# Patient Record
Sex: MALE | Race: WHITE | URBAN - METROPOLITAN AREA
[De-identification: names, ages, dates, MRNs, and addresses within clinical notes are randomized per-mention and may not be internally consistent; named-entity substitution may affect disease eponyms.]

---

## 2018-03-19 ENCOUNTER — OFFICE VISIT (OUTPATIENT)
Dept: NEUROLOGY | Age: 38
End: 2018-03-19

## 2018-03-19 VITALS
OXYGEN SATURATION: 98 % | HEIGHT: 70 IN | RESPIRATION RATE: 19 BRPM | HEART RATE: 76 BPM | TEMPERATURE: 98.2 F | WEIGHT: 178.2 LBS | DIASTOLIC BLOOD PRESSURE: 78 MMHG | BODY MASS INDEX: 25.51 KG/M2 | SYSTOLIC BLOOD PRESSURE: 118 MMHG

## 2018-03-19 DIAGNOSIS — G43.111 INTRACTABLE MIGRAINE WITH AURA WITH STATUS MIGRAINOSUS: Primary | ICD-10-CM

## 2018-03-19 RX ORDER — FROVATRIPTAN SUCCINATE 2.5 MG/1
TABLET, FILM COATED ORAL AS NEEDED
COMMUNITY
Start: 2018-03-15

## 2018-03-19 RX ORDER — VERAPAMIL HYDROCHLORIDE 120 MG/1
CAPSULE, EXTENDED RELEASE ORAL
Refills: 4 | COMMUNITY
Start: 2018-02-14

## 2018-03-19 RX ORDER — DEXTROAMPHETAMINE SACCHARATE, AMPHETAMINE ASPARTATE, DEXTROAMPHETAMINE SULFATE AND AMPHETAMINE SULFATE 5; 5; 5; 5 MG/1; MG/1; MG/1; MG/1
TABLET ORAL 3 TIMES DAILY
COMMUNITY
Start: 2018-03-05

## 2018-03-19 RX ORDER — METHYLPHENIDATE HYDROCHLORIDE 10 MG/1
TABLET ORAL
COMMUNITY
Start: 2018-01-25

## 2018-03-19 RX ORDER — METHYLPREDNISOLONE 4 MG/1
TABLET ORAL
COMMUNITY
Start: 2018-03-15

## 2018-03-19 RX ORDER — DIVALPROEX SODIUM 250 MG/1
TABLET, EXTENDED RELEASE ORAL 2 TIMES DAILY
COMMUNITY
Start: 2018-03-15

## 2018-03-19 RX ORDER — NABUMETONE 750 MG/1
TABLET, FILM COATED ORAL DAILY
COMMUNITY
Start: 2018-02-25

## 2018-03-19 RX ORDER — ATOMOXETINE 40 MG/1
CAPSULE ORAL 2 TIMES DAILY
COMMUNITY
Start: 2018-03-14

## 2018-03-19 RX ORDER — RIZATRIPTAN BENZOATE 10 MG/1
TABLET, ORALLY DISINTEGRATING ORAL AS NEEDED
COMMUNITY
Start: 2018-03-05

## 2018-03-19 RX ORDER — NARATRIPTAN 2.5 MG/1
TABLET ORAL AS NEEDED
COMMUNITY
Start: 2018-02-22

## 2018-03-19 NOTE — PATIENT INSTRUCTIONS
Recommended not Vane Wilkinson 'triptan'  Daily. We discussed analgesic induced HA and the use of analgesics more than 2-3 times weekly will do nothing but drive the HA and to break this cycle all analgesics, whether OTC or prescribed have to be discontinued and HA likely to get worse before they get better     Abortive therapy that may be helpful : zembrace, zomig, onzetra.

## 2018-03-19 NOTE — PROGRESS NOTES
German Bernard is a 40 y.o. male presents today as a new patient. Patient was diagnosed  with migraines at the age of 27. Patient started treatment at the age of 27. Patient had no days of being headache free in the past 30 days. Patient has been having headaches everyday. Patient have sensitivity to light and sound. Patient stated the headaches worsen with movement. Paitent has experienced nausea in the last month. Triggering factors are anxiety/worry, bright lights, food , high pitched noises, irregular meals over exertion and salty food. Patient describes the pain as throbbing, pulsation, pressing, squeezing, dull ache.nagging, and stabbing intermittently. Patient had a head injury and loss consciousness at the age of 13 or 12. Darkness, quiet, rest, scalp/temple pressure and activity  makes the headaches better per patient. Patient exercise regularly, depressed /anxoius due to the migraines and drinks 2 cups of coffee or tea.

## 2018-03-19 NOTE — MR AVS SNAPSHOT
[address] [place] [address] [address] [place] [address] 
[phone] Patient: Eber Vu MRN: [id number]:[id number] Visit Information Date & Time Provider Department Dept. Phone Encounter #  
 3/19/2018  8:00 AM HENRIETTA [facility] [phone] [id number] Upcoming Health Maintenance Date Due DTaP/Tdap/Td series (1 - Tdap) 4/1/2001 Influenza Age 5 to Adult 8/1/2017 Allergies as of 3/19/2018  Review Complete On: 3/19/2018 By: Bruce Baires LPN No Known Allergies Current Immunizations  Never Reviewed No immunizations on file. Not reviewed this visit Vitals BP Pulse Temp Resp Height(growth percentile) Weight(growth percentile) 118/78 76 98.2 °F (36.8 °C) (Oral) 19 5' 10\" (1.778 m) 178 lb 3.2 oz (80.8 kg) SpO2 BMI 98% 25.57 kg/m2 BMI and BSA Data Body Mass Index Body Surface Area 25.57 kg/m 2 2 m 2 Preferred Pharmacy Pharmacy Name Phone [address] [facility]. [phone] Your Updated Medication List  
  
   
This list is accurate as of 3/19/18  9:14 AM.  Always use your most recent med list.  
  
  
  
  
 atomoxetine 40 mg capsule Commonly known as:  STRATTERA  
two (2) times a day. dextroamphetamine-amphetamine 20 mg tablet Commonly known as:  ADDERALL  
three (3) times daily. divalproex  mg ER tablet Commonly known as:  DEPAKOTE ER  
two (2) times a day. frovatriptan 2.5 mg tablet Commonly known as:  Jullie Getting  
as needed. methylphenidate HCl 10 mg tablet Commonly known as:  RITALIN  
  
 methylPREDNISolone 4 mg tablet Commonly known as:  MEDROL DOSEPACK  
  
 nabumetone 750 mg tablet Commonly known as:  RELAFEN  
daily. 2 tablets  
  
 naratriptan 2.5 mg Tab Commonly known as:  Suan Radha  
 as needed. rizatriptan 10 mg disintegrating tablet Commonly known as:  MAXALT-MLT  
as needed. verapamil  mg ER capsule Commonly known as:  VERELAN  
TK ONE C PO HS Patient Instructions Recommended not Nicol Downer 'triptan'  Daily. We discussed analgesic induced HA and the use of analgesics more than 2-3 times weekly will do nothing but drive the HA and to break this cycle all analgesics, whether OTC or prescribed have to be discontinued and HA likely to get worse before they get better Abortive therapy that may be helpful : zembrace, zomig, onzetra. Introducing [facility]! Zuhair Freeman introduces [email or web address] patient portal. Now you can access parts of your medical record, email your doctor's office, and request medication refills online. 1. In your internet browser, go to https://"[employer]. [employer]/"[employer] 2. Click on the First Time User? Click Here link in the Sign In box. You will see the New Member Sign Up page. 3. Enter your [email or web address] Access Code exactly as it appears below. You will not need to use this code after youve completed the sign-up process. If you do not sign up before the expiration date, you must request a new code. · [email or web address] Access Code: [email or web address] Expires: 6/17/2018  9:04 AM 
 
4. Enter the last four digits of your Social Security Number (xxxx) and Date of Birth (mm/dd/yyyy) as indicated and click Submit. You will be taken to the next sign-up page. 5. Create a [email or web address] ID. This will be your [email or web address] login ID and cannot be changed, so think of one that is secure and easy to remember. 6. Create a [email or web address] password. You can change your password at any time. 7. Enter your Password Reset Question and Answer. This can be used at a later time if you forget your password. 8. Enter your e-mail address. You will receive e-mail notification when new information is available in [address] 9. Click Sign Up. You can now view and download portions of your medical record. 10. Click the Download Summary menu link to download a portable copy of your medical information. If you have questions, please visit the Frequently Asked Questions section of the [employer] website. Remember, [employer] is NOT to be used for urgent needs. For medical emergencies, dial 911. Now available from your iPhone and Android! Please provide this summary of care documentation to your next provider. Your primary care clinician is listed as Claudene Charity. If you have any questions after today's visit, please call [phone].

## 2018-03-19 NOTE — PROGRESS NOTES
NEUROLOGY NEW PATIENT CONSULTATION    REFERRED BY:  Brisa Santana MD    CHIEF COMPLAINT:Headaches       HISTORY OF PRESENT ILLNESS    HISTORY PROVIDED BY:  Patient  Family Member: wife      Liya Kendall is a 40 y.o. male who I am asked to see in consultation for headache Hes here today to get the second opinion. he is currently seeing Dr. Se Dietrich at [facility] for migraine headaches. He notes that he was diagnosed in 2010 with migraines and was placed on verapamil  And naratriptam.  He was headaches free for about eight years now. But recently about early February, he started to have severe headaches located entire head described the pain as a throbbing pulsating pain. He denies any new lifestyle changes. no new medications or stressors. No brain injuries. Loss consciousnesswhen he was a teenager  Dr. Se Dietrich recently placed him on verapamil and Depakote ER. He started this medication less than a month ago he. He still complains of daily headaches which he only gets relief from a nasal aesthetic. He notes that hell have relief anywhere from 12 to 24 hours. Dr. Se Dietrich also prescribed him infusions. The patient is not for sure of what his headache cocktail consists of but notes only gave him a relief for a brief period of time. The patient was wondering if the treatment course  Dr. Se Dietrich have in place is effective and corrected. We discussed that he has not been on the medication long enough to determine if the medication is working. He recently just started both verapamil and Depakote ER. Hes also taking forvotriptain daily and Medrol Dose pack.         Headache Characterization: throbbing pulsating   Pain Level: /10  Aura: yesterday for the first time he saw white flashes  Frequency/Length:  12hr   Location:   behind the eyes and temporal   Nausea/Vomiting: no., sometimes   Photophobia: yes   Phonophobia: yes   Provoking factors:  Looking at the computer for a long time   Relieving factors: laying with eye close in a dark room  Focal neurologic symptoms with headache:none     Meds:  Prior abortive tx:none  Prior preventative tx: verapamil 120mg ,depakote ER, Naratriptan     Current abortive tx:Frovartriptan  Current preventative tx:  verapamil 120mg ,depakote ER  Family Hx of headaches/migraines: sister  And mother    Social:  Work:    Caffeine:yes 1-2 coffees   Tobacco use:no  Sleep habits he sleep pretty well 6 hours  Exercise: yes           PMH  Past Medical History:   Diagnosis Date    ADD (attention deficit disorder)     Fatigue     Frequent headaches     Migraine        SH  Social History     Social History    Marital status:      Spouse name: N/A    Number of children: N/A    Years of education: N/A     Social History Main Topics    Smoking status: Never Smoker    Smokeless tobacco: Never Used    Alcohol use 1.2 oz/week     2 Cans of beer per week    Drug use: None    Sexual activity: Not Asked     Other Topics Concern    None     Social History Narrative    None       FH  Family History   Problem Relation Age of Onset    Cancer Mother      skin    Headache Mother     Asthma Mother     Headache Sister     Asthma Brother     Heart Disease Maternal Grandfather        ALLERGIES  No Known Allergies    CURRENT MEDS      REVIEW OF SYSTEMS:     Y  N       Y  N  Y  N   Y  N  [] [x] AIDS          [] [x] Falls  [] [x] Memory Loss  [] [x]  Shortness of breath  [] [x] Anxiety          [] [x] Fatigue [] [x] Muscle Pain        [] [x]  Skipped beats  [] [x] Chest Pain   [x] [] Frequent HA [] [x] Ms Weakness     [] [x]  Snoring  [] [x] Constipation [] [x]Hearing loss [] [x] Nause/Vomiting  [] [x]  Stomach Pain  [] [x] Cough          [] [x]Hepatitis [] [x] Neuropathy         [] [x]  Swallowing difficulty  [] [x] Depression  [] [x]Incontinence [] [x] Poor appetite      [] [x]  Vertigo  [] [x] Diarrhea       [] [x] Joint Pain [] [x] Rash                   [] [x]  Visual disturbances  [] [x] Fainting        [] [x] Leg Swelling [] [x] Ringing ears       [] [x]  Weight changes      []Unable to obtain  ROS due to  []mental status change  []sedated   []intubated          PREVIOUS WORKUP  IMAGING:  (I personally reviewed these images in PACS and this is my impression)    LABS  No results found for this or any previous visit. PHYSICAL EXAM  Visit Vitals    /78    Pulse 76    Temp 98.2 °F (36.8 °C) (Oral)    Resp 19    Ht 5' 10\" (1.778 m)    Wt 178 lb 3.2 oz (80.8 kg)    SpO2 98%    BMI 25.57 kg/m2     General:  Alert, cooperative, no distress. Head:  Normocephalic, without obvious abnormality, atraumatic. Eyes:  Conjunctivae/corneas clear. Pupils equal, round, reactive to light. Extraocular movements intact, VFF, NO papilledema   Lungs:  Heart:   Non labored breathing  Regular rate and rhythm, no carotid bruits   Abdomen:   Soft, non-distended   Extremities: Extremities normal, atraumatic, no cyanosis or edema. Pulses: 2+ and symmetric all extremities. Skin: Skin color, texture, turgor normal. No rashes or lesions.    Neurologic:  Gen: Attention normal             Language: naming, repetition, fluency normal             Memory: intact recent and remote memory  Cranial Nerves:  I: smell Not tested   II: visual fields Full to confrontation   II: pupils Equal, round, reactive to light   II: optic disc No papilledema   III,VII: ptosis none   III,IV,VI: extraocular muscles  Full ROM   V: mastication normal   V: facial light touch sensation  normal   VII: facial muscle function   symmetric   VIII: hearing symmetric   IX: soft palate elevation  normal   XI: trapezius strength  5/5   XI: sternocleidomastoid strength 5/5   XI: neck flexion strength  5/5   XII: tongue  midline     Motor: normal bulk and tone, no tremor              Strength: 5/5 all four extremities  Coordination: FTN intact,   Gait: normal gait   Reflexes: 2+ throughout       IMPRESSION  Jody Flanagan is a 40 y.o. male who presents for evaluation of  Headaches. He wants a second opinion about his migraine management. He is currently seeing Dr. Vianney Freeman at [address] [place] [address] for management.  had started him on verapamil 120 mg of Depakote ER. He is also on a metal does pack. And then also taken the trip tails daily. We discussed migraine pathophysiology and the medications we use to treat them including triptans for abortive therapy and medications use to prevent. We discussed migraines are to be viewed as a chronic condition which can be managed much like diabetes or HTN. We discussed that he has not been on any medications long enough to determine whether the medication is effective. We discussed Botox,  he needs to have tried at least three different preventatives within three different categories to qualify for migraines. We discussed analgesic induced HA and the use of analgesics more than 2-3 times weekly will do nothing but drive the HA and to break this cycle all analgesics, whether OTC or prescribed have to be discontinued and HA likely to get worse before they get better. Recommend that he stop taking Frova daily. We discuss abortive therapies that he may try that may be effective which includes zembrace, zomig, onzetra . We discussed that if he would like us to manage his migraines that he is not able to go back-and-forth between neurologists. He will make a decision and get back to us. In the meantime, I would like his records to be sent over so we can evaluate what he has tried for his migraines. FU in 2 months if he would like us to manage his migraines. 40 minutes of this 60 minute office visit was spent in education and counseling regarding  Migraines, treatment option, etc ( listed over ). This note will not be viewable in [address]      Wynn Mohs, NP      CC: Amy Gutierrez MD  Fax: [phone]